# Patient Record
Sex: MALE | Race: AMERICAN INDIAN OR ALASKA NATIVE
[De-identification: names, ages, dates, MRNs, and addresses within clinical notes are randomized per-mention and may not be internally consistent; named-entity substitution may affect disease eponyms.]

---

## 2019-11-21 ENCOUNTER — HOSPITAL ENCOUNTER (OUTPATIENT)
Dept: HOSPITAL 5 - MRI | Age: 24
Discharge: HOME | End: 2019-11-21
Attending: INTERNAL MEDICINE
Payer: COMMERCIAL

## 2019-11-21 DIAGNOSIS — G35: Primary | ICD-10-CM

## 2019-11-21 PROCEDURE — 70551 MRI BRAIN STEM W/O DYE: CPT

## 2019-11-21 NOTE — MAGNETIC RESONANCE REPORT
MRI BRAIN 11/21/2019



INDICATION / CLINICAL INFORMATION:

MULTIPLE SCLEROSIS/LOW VISION/ONE EYE.



TECHNIQUE: 

Multiplanar, multisequence MR images of the brain were obtained.



COMPARISON: 

None available.



FINDINGS:



BRAIN / INTRACRANIAL CONTENTS: Unenhanced MR images of the brain were obtained. No previous studies a
re available here for comparison.



There are numerous focal and patchy periventricular white matter T2 weighted hyperintensities present
 throughout the cerebral hemispheres bilaterally. The configuration and pattern of these white matter
 lesions is compatible with an suggestive of multiple sclerosis. There is no evidence of acute ischem
ic injury.



Ventricles and sulci are normal in size and shape.



There are no abnormal extra-axial fluid collections. There is no evidence of hemorrhage. 



EXTRACRANIAL: Unremarkable



CRANIOCERVICAL JUNCTION: No significant abnormality.



VASCULAR FLOW-VOIDS: No significant abnormality.









IMPRESSION:

 Numerous periventricular white matter lesions, in a pattern and distribution consistent with multipl
e sclerosis. 



Signer Name: Matt Luque MD 

Signed: 11/21/2019 2:13 PM

 Workstation Name: [id number]